# Patient Record
Sex: MALE
[De-identification: names, ages, dates, MRNs, and addresses within clinical notes are randomized per-mention and may not be internally consistent; named-entity substitution may affect disease eponyms.]

---

## 2023-10-20 ENCOUNTER — HOSPITAL ENCOUNTER (OUTPATIENT)
Dept: HOSPITAL 19 - SDCO | Age: 48
Discharge: HOME | End: 2023-10-20
Attending: INTERNAL MEDICINE
Payer: COMMERCIAL

## 2023-10-20 VITALS — DIASTOLIC BLOOD PRESSURE: 85 MMHG | HEART RATE: 78 BPM | SYSTOLIC BLOOD PRESSURE: 112 MMHG

## 2023-10-20 VITALS — SYSTOLIC BLOOD PRESSURE: 117 MMHG | HEART RATE: 85 BPM | TEMPERATURE: 97.2 F | DIASTOLIC BLOOD PRESSURE: 83 MMHG

## 2023-10-20 VITALS — SYSTOLIC BLOOD PRESSURE: 109 MMHG | DIASTOLIC BLOOD PRESSURE: 76 MMHG | HEART RATE: 73 BPM | TEMPERATURE: 97.4 F

## 2023-10-20 VITALS — BODY MASS INDEX: 28.62 KG/M2 | WEIGHT: 182.32 LBS | HEIGHT: 67 IN

## 2023-10-20 DIAGNOSIS — I10: ICD-10-CM

## 2023-10-20 DIAGNOSIS — Z12.11: Primary | ICD-10-CM

## 2023-10-20 NOTE — NUR
Dr. Glasgow has been in to speak with the patient and his wife regarding the
findings of procedure. Discharge instructions were reviewed with the patient
and his wife. They both verbalized understanding and have no questions for the
nurse at this time. The patient's IV to his right anecubital was removed and a
pressure dressing was applied to the site. The nurse instructed the patient to
get dressed and notify the staff when he is ready to be escorted out.
PATIENT AMBULATED TO [facility] 5 AT 0928 WITH STEADY GAIT. ALERT AND ORIENTED X4.
PATIENT STATED UNDERSTANDING OF PROCEDURE. CONSENTS SIGNED. ASSESSMENT
COMPLETED. 22G IV STARTED IN RIGHT AC BY ALEX LIRIANO. B/P 138/103 ON ADMISSION.
RE-TOOK B/P AFTER PATIENT PUT ON GOWN. RETAKE /83.
ERICKA PARIKH NOTIFIED OF ELEVEATED B/P.
WARM BLANKET PROVIDED. NO FURTHER NEEDS NOTED.
The patient arrived back to [facility] from the endoscopy suite at this time. The
patient appears alert and oriented and ambulated from the cart to the recliner
in his room with the stand by assistance of two nurses and appeared to
tolerate the activity well. Post procedure vital signs were started at this
time. The patient's wife is at his bedside. The patient agrees to try some
water and a muffin at this time. Call light is within reach. Warm blanket
provided. Denies any further needs.
The patient was escorted out via wheelchair to a private vehicle by HERI Gage.
The patient's belongings and discharge paperwork were sent with him. The
patient's wife, Chio, is present to drive him home.
None